# Patient Record
Sex: MALE | Race: BLACK OR AFRICAN AMERICAN | URBAN - METROPOLITAN AREA
[De-identification: names, ages, dates, MRNs, and addresses within clinical notes are randomized per-mention and may not be internally consistent; named-entity substitution may affect disease eponyms.]

---

## 2017-04-11 ENCOUNTER — LAB ENCOUNTER (OUTPATIENT)
Dept: LAB | Age: 67
End: 2017-04-11
Attending: FAMILY MEDICINE
Payer: COMMERCIAL

## 2017-04-11 ENCOUNTER — OFFICE VISIT (OUTPATIENT)
Dept: FAMILY MEDICINE CLINIC | Facility: CLINIC | Age: 67
End: 2017-04-11

## 2017-04-11 ENCOUNTER — PRIOR ORIGINAL RECORDS (OUTPATIENT)
Dept: OTHER | Age: 67
End: 2017-04-11

## 2017-04-11 VITALS
BODY MASS INDEX: 29.62 KG/M2 | HEIGHT: 69 IN | SYSTOLIC BLOOD PRESSURE: 158 MMHG | WEIGHT: 200 LBS | RESPIRATION RATE: 16 BRPM | DIASTOLIC BLOOD PRESSURE: 108 MMHG | HEART RATE: 85 BPM

## 2017-04-11 DIAGNOSIS — Z76.89 ESTABLISHING CARE WITH NEW DOCTOR, ENCOUNTER FOR: Primary | ICD-10-CM

## 2017-04-11 DIAGNOSIS — I10 ESSENTIAL HYPERTENSION: ICD-10-CM

## 2017-04-11 DIAGNOSIS — Z12.11 COLON CANCER SCREENING: ICD-10-CM

## 2017-04-11 DIAGNOSIS — Z12.5 PROSTATE CANCER SCREENING: ICD-10-CM

## 2017-04-11 DIAGNOSIS — Z00.00 PREVENTATIVE HEALTH CARE: ICD-10-CM

## 2017-04-11 DIAGNOSIS — M17.0 BILATERAL PRIMARY OSTEOARTHRITIS OF KNEE: ICD-10-CM

## 2017-04-11 PROCEDURE — 93010 ELECTROCARDIOGRAM REPORT: CPT | Performed by: FAMILY MEDICINE

## 2017-04-11 PROCEDURE — 93005 ELECTROCARDIOGRAM TRACING: CPT | Performed by: FAMILY MEDICINE

## 2017-04-11 PROCEDURE — 85025 COMPLETE CBC W/AUTO DIFF WBC: CPT

## 2017-04-11 PROCEDURE — 80061 LIPID PANEL: CPT

## 2017-04-11 PROCEDURE — 81001 URINALYSIS AUTO W/SCOPE: CPT

## 2017-04-11 PROCEDURE — 36415 COLL VENOUS BLD VENIPUNCTURE: CPT

## 2017-04-11 PROCEDURE — 99387 INIT PM E/M NEW PAT 65+ YRS: CPT | Performed by: FAMILY MEDICINE

## 2017-04-11 PROCEDURE — 99204 OFFICE O/P NEW MOD 45 MIN: CPT | Performed by: FAMILY MEDICINE

## 2017-04-11 PROCEDURE — 84443 ASSAY THYROID STIM HORMONE: CPT

## 2017-04-11 PROCEDURE — 80053 COMPREHEN METABOLIC PANEL: CPT

## 2017-04-11 NOTE — PATIENT INSTRUCTIONS
All adult screening ordered and done appropriate for patient's age and gender and risk factors and complaints. Monitor blood pressures and record at home. Limit salt intake. Start ARB. Colonoscopy recommended.

## 2017-04-24 ENCOUNTER — APPOINTMENT (OUTPATIENT)
Dept: LAB | Age: 67
End: 2017-04-24
Attending: FAMILY MEDICINE
Payer: COMMERCIAL

## 2017-04-24 DIAGNOSIS — Z12.11 COLON CANCER SCREENING: ICD-10-CM

## 2017-04-24 PROCEDURE — 82274 ASSAY TEST FOR BLOOD FECAL: CPT

## 2017-04-26 NOTE — PROGRESS NOTES
HPI:    Patient ID: Victor Hugo Krishnamurthy is a 78year old male. HPI Comments: 78year old AA male here for establishing care with new physician. Needs prostate and colon screen. Has history of hypertension. Hypertension  This is a chronic problem.  The Bowel sounds are normal. He exhibits no distension. Musculoskeletal:        Right knee: He exhibits decreased range of motion. Tenderness found. Left knee: He exhibits decreased range of motion. Tenderness found.    Neurological: He is alert and or

## 2017-05-02 ENCOUNTER — TELEPHONE (OUTPATIENT)
Dept: FAMILY MEDICINE CLINIC | Facility: CLINIC | Age: 67
End: 2017-05-02

## 2017-05-02 DIAGNOSIS — I10 ESSENTIAL HYPERTENSION: ICD-10-CM

## 2017-05-02 DIAGNOSIS — R94.31 ABNORMAL EKG: Primary | ICD-10-CM

## 2017-05-02 NOTE — TELEPHONE ENCOUNTER
SORRY!!! Some kind of way my smart phrase for normal labs got switched to this phrase. His labs have been reviewed and are normal and there was no TD screening done on this patient.

## 2017-05-02 NOTE — TELEPHONE ENCOUNTER
Doctor, please see pt question below and advise. Noted on blood test results you noted STD results had been reported to pt, no STD testing noted in EMR. Please advise.

## 2017-05-03 NOTE — TELEPHONE ENCOUNTER
Call transferred to RN from [address] [place] [address]. Verified pt name and . Reviewed test results per doctor's recommendations. Pt is asking when he needs to follow up with doctor, states he is not having any symptoms at this time. Please advise.

## 2017-05-04 NOTE — TELEPHONE ENCOUNTER
In 3 weeks with me regarding his blood pressure or after cardiology appointment which I recommended as his EKG showed changes consistent with longstanding hypertension and we want to make sure there is no lack of blood flow threat.  Referral on chart for ca

## 2017-05-05 NOTE — TELEPHONE ENCOUNTER
Spoke with pt informed Dr Patiño Dire recommendations. Gave pt information to Dr Lesly Calvin office.

## 2017-08-01 LAB
ALBUMIN: 3.6 G/DL
ALKALINE PHOSPHATATE(ALK PHOS): 77 IU/L
ALT (SGPT): 20 U/L
AST (SGOT): 28 U/L
BILIRUBIN TOTAL: 0.8 MG/DL
BUN: 8 MG/DL
CALCIUM: 9.2 MG/DL
CHLORIDE: 103 MEQ/L
CHOLESTEROL, TOTAL: 132 MG/DL
CREATININE, SERUM: 1.07 MG/DL
GLOBULIN: 3.1 G/DL
GLUCOSE: 94 MG/DL
GLUCOSE: 94 MG/DL
HDL CHOLESTEROL: 46 MG/DL
LDL CHOLESTEROL: 71 MG/DL
POTASSIUM, SERUM: 3.9 MEQ/L
PROTEIN, TOTAL: 6.7 G/DL
SGOT (AST): 28 IU/L
SGPT (ALT): 20 IU/L
SODIUM: 139 MEQ/L
TRIGLYCERIDES: 76 MG/DL

## 2022-09-01 NOTE — TELEPHONE ENCOUNTER
Per pt, he stts that he's been waiting for his blood test result and Stool occult test since  last month. 100.3

## (undated) NOTE — MR AVS SNAPSHOT
[facility]  [address], [address]  [phone]               Thank you for choosing us for your health care visit with Gwen Alcantar DO.   We are glad to serve you and happy to provide you with this sum Jonathon Roberts DO   [address] [place] [address],[address]   Phone:  [phone]   Fax:  [phone]    Diagnoses:  Colon cancer screening   Order:  Lexy Alexandra - Melany Portillo MD   [address], [address] [place] [address] schedule your appointment. Failure to obtain required authorization numbers can create reimbursement difficulties for you.       Assoc Dx:  Colon cancer screening [Z12.11]          Reason for Today's Visit     Establish Care           Medical Issues Discuss Your blood pressure indicates you may be at-risk for Hypertension. Please consider the following Lifestyle Modifications. Also, please return for a follow-up Blood Pressure Check in 1 month.      Lifestyle Modification Recommendations:    Modification R Start activities slowly and build up over time Do what you like   Get your heart pumping – brisk walking, biking, swimming Even 10 minute increments are effective and add up over the week   2 ½ hours per week – spread out over time Use a batsheva to keep you